# Patient Record
Sex: FEMALE | Race: WHITE | URBAN - METROPOLITAN AREA
[De-identification: names, ages, dates, MRNs, and addresses within clinical notes are randomized per-mention and may not be internally consistent; named-entity substitution may affect disease eponyms.]

---

## 2019-05-07 ENCOUNTER — HOSPITAL ENCOUNTER (OUTPATIENT)
Age: 69
Setting detail: SPECIMEN
Discharge: HOME | End: 2019-05-07
Payer: MEDICARE

## 2019-05-07 LAB
ABSOLUTE EOS #: 0.04 K/UL (ref 0–0.44)
ABSOLUTE IMMATURE GRANULOCYTE: <0.03 K/UL (ref 0–0.3)
ABSOLUTE LYMPH #: 2.31 K/UL (ref 1.1–3.7)
ABSOLUTE MONO #: 0.31 K/UL (ref 0.1–1.2)
ALBUMIN SERPL-MCNC: 4.2 G/DL (ref 3.5–5.2)
ALBUMIN/GLOBULIN RATIO: 1.5 (ref 1–2.5)
ALP BLD-CCNC: 60 U/L (ref 35–104)
ALT SERPL-CCNC: 9 U/L (ref 5–33)
ANION GAP SERPL CALCULATED.3IONS-SCNC: 14 MMOL/L (ref 9–17)
AST SERPL-CCNC: 13 U/L
BASOPHILS # BLD: 1 % (ref 0–2)
BASOPHILS ABSOLUTE: 0.05 K/UL (ref 0–0.2)
BILIRUB SERPL-MCNC: 0.43 MG/DL (ref 0.3–1.2)
BUN BLDV-MCNC: 15 MG/DL (ref 8–23)
BUN/CREAT BLD: NORMAL (ref 9–20)
CALCIUM SERPL-MCNC: 9.7 MG/DL (ref 8.6–10.4)
CHLORIDE BLD-SCNC: 104 MMOL/L (ref 98–107)
CHOLESTEROL, FASTING: 152 MG/DL
CHOLESTEROL/HDL RATIO: 2.7
CO2: 25 MMOL/L (ref 20–31)
CREAT SERPL-MCNC: 0.5 MG/DL (ref 0.5–0.9)
DIFFERENTIAL TYPE: ABNORMAL
EOSINOPHILS RELATIVE PERCENT: 1 % (ref 1–4)
GFR AFRICAN AMERICAN: >60 ML/MIN
GFR NON-AFRICAN AMERICAN: >60 ML/MIN
GFR SERPL CREATININE-BSD FRML MDRD: NORMAL ML/MIN/{1.73_M2}
GFR SERPL CREATININE-BSD FRML MDRD: NORMAL ML/MIN/{1.73_M2}
GLUCOSE BLD-MCNC: 81 MG/DL (ref 70–99)
HCT VFR BLD CALC: 49.9 % (ref 36.3–47.1)
HDLC SERPL-MCNC: 56 MG/DL
HEMOGLOBIN: 16.3 G/DL (ref 11.9–15.1)
IMMATURE GRANULOCYTES: 0 %
LDL CHOLESTEROL: 72 MG/DL (ref 0–130)
LYMPHOCYTES # BLD: 37 % (ref 24–43)
MCH RBC QN AUTO: 30.6 PG (ref 25.2–33.5)
MCHC RBC AUTO-ENTMCNC: 32.7 G/DL (ref 28.4–34.8)
MCV RBC AUTO: 93.6 FL (ref 82.6–102.9)
MONOCYTES # BLD: 5 % (ref 3–12)
NRBC AUTOMATED: 0 PER 100 WBC
PDW BLD-RTO: 13.3 % (ref 11.8–14.4)
PLATELET # BLD: 238 K/UL (ref 138–453)
PLATELET ESTIMATE: ABNORMAL
PMV BLD AUTO: 9.7 FL (ref 8.1–13.5)
POTASSIUM SERPL-SCNC: 4.3 MMOL/L (ref 3.7–5.3)
RBC # BLD: 5.33 M/UL (ref 3.95–5.11)
RBC # BLD: ABNORMAL 10*6/UL
SEG NEUTROPHILS: 56 % (ref 36–65)
SEGMENTED NEUTROPHILS ABSOLUTE COUNT: 3.45 K/UL (ref 1.5–8.1)
SODIUM BLD-SCNC: 143 MMOL/L (ref 135–144)
TOTAL PROTEIN: 7 G/DL (ref 6.4–8.3)
TRIGLYCERIDE, FASTING: 120 MG/DL
TSH SERPL DL<=0.05 MIU/L-ACNC: 0.75 MIU/L (ref 0.3–5)
VLDLC SERPL CALC-MCNC: NORMAL MG/DL (ref 1–30)
WBC # BLD: 6.2 K/UL (ref 3.5–11.3)
WBC # BLD: ABNORMAL 10*3/UL

## 2025-02-04 ENCOUNTER — OFFICE VISIT (OUTPATIENT)
Age: 75
End: 2025-02-04
Payer: MEDICARE

## 2025-02-04 VITALS
WEIGHT: 250.4 LBS | HEIGHT: 62 IN | SYSTOLIC BLOOD PRESSURE: 132 MMHG | HEART RATE: 77 BPM | BODY MASS INDEX: 46.08 KG/M2 | DIASTOLIC BLOOD PRESSURE: 86 MMHG | RESPIRATION RATE: 18 BRPM

## 2025-02-04 DIAGNOSIS — R94.6 ABNORMAL THYROID FUNCTION TEST: Primary | ICD-10-CM

## 2025-02-04 PROCEDURE — 1160F RVW MEDS BY RX/DR IN RCRD: CPT | Performed by: INTERNAL MEDICINE

## 2025-02-04 PROCEDURE — 99204 OFFICE O/P NEW MOD 45 MIN: CPT | Performed by: INTERNAL MEDICINE

## 2025-02-04 PROCEDURE — 1123F ACP DISCUSS/DSCN MKR DOCD: CPT | Performed by: INTERNAL MEDICINE

## 2025-02-04 PROCEDURE — 1159F MED LIST DOCD IN RCRD: CPT | Performed by: INTERNAL MEDICINE

## 2025-02-04 RX ORDER — OXYBUTYNIN CHLORIDE 10 MG/1
10 TABLET, EXTENDED RELEASE ORAL DAILY
COMMUNITY
Start: 2025-01-13

## 2025-02-04 NOTE — PROGRESS NOTES
[facility] [place] [facility]  [facility] ENDOCRINOLOGY  [address]  [facility] [address]  Dept: [phone]  Loc: [phone]     Visit Date: 2/4/2025    Deyanira Andrea is a 74 y.o. female who presents today for:  Chief Complaint   Patient presents with    New Patient            Subjective:     Deyanira Andrea is a 74 y.o. , female who comes for initial visit .    Kam Goff MD  Deyanira Andrea was diagnosed with hyperthyroidism 1 month ago.   Underlying cause of hyperthyroidism is  not known .   Current therapy includes  none .    Symptoms are as follows: Heat intolerance, Fatigue, and frequent bowels sometimes .    There is no thyroid pain, choking or hoarseness of the voice.    The patient reports no problems with Her eyes, and She has not noticed any skin lesions recently.  The patient tells me that she had an ultrasound of the thyroid gland done 3 months ago.  This was done at [facility] but I do not have that report.  We will obtain this test.  Past Medical History:   Diagnosis Date    Arthritis     Hypertension     Nausea & vomiting       Past Surgical History:   Procedure Laterality Date    ENDOMETRIAL ABLATION      HYSTERECTOMY (CERVIX STATUS UNKNOWN)         Family History   Problem Relation Age of Onset    Cancer Mother         pancreatic    Cancer Father         colon    Heart Disease Father     Stroke Maternal Grandmother      Social History     Tobacco Use    Smoking status: Every Day     Current packs/day: 0.25     Types: Cigarettes    Smokeless tobacco: Not on file   Substance Use Topics    Alcohol use: No      Current Outpatient Medications   Medication Sig Dispense Refill    oxyBUTYnin (DITROPAN-XL) 10 MG extended release tablet Take 1 tablet by mouth daily      Multiple Vitamin (MULTIVITAMIN PO) Take by mouth      atenolol (TENORMIN) 50 MG tablet Take 50 mg by mouth 2 times daily.      potassium chloride (KLOR-CON) 20 MEQ packet Take 20 mEq by mouth

## 2025-02-05 LAB
SED RATE, AUTOMATED: 6 MM/HR
T3 FREE: 3.49 PG/ML (ref 2.8–4.4)
T4 FREE: 0.95 NG/DL (ref 0.61–1.12)
TSH SERPL DL<=0.05 MIU/L-ACNC: 0.69 MCIU/ML (ref 0.49–4.67)

## 2025-02-07 LAB — THYROID STIMULATING IMMUNOGLOBULIN: <1 TSI INDEX
